# Patient Record
Sex: FEMALE | Race: WHITE
[De-identification: names, ages, dates, MRNs, and addresses within clinical notes are randomized per-mention and may not be internally consistent; named-entity substitution may affect disease eponyms.]

---

## 2018-02-08 ENCOUNTER — HOSPITAL ENCOUNTER (OUTPATIENT)
Dept: HOSPITAL 75 - CARD | Age: 73
End: 2018-02-08
Attending: INTERNAL MEDICINE
Payer: MEDICARE

## 2018-02-08 VITALS — DIASTOLIC BLOOD PRESSURE: 93 MMHG | SYSTOLIC BLOOD PRESSURE: 152 MMHG

## 2018-02-08 VITALS — DIASTOLIC BLOOD PRESSURE: 84 MMHG | SYSTOLIC BLOOD PRESSURE: 179 MMHG

## 2018-02-08 DIAGNOSIS — I10: ICD-10-CM

## 2018-02-08 DIAGNOSIS — E66.9: ICD-10-CM

## 2018-02-08 DIAGNOSIS — E78.5: ICD-10-CM

## 2018-02-08 DIAGNOSIS — I63.9: ICD-10-CM

## 2018-02-08 DIAGNOSIS — Z01.810: Primary | ICD-10-CM

## 2018-02-08 PROCEDURE — 78452 HT MUSCLE IMAGE SPECT MULT: CPT

## 2018-02-08 PROCEDURE — 93017 CV STRESS TEST TRACING ONLY: CPT

## 2018-02-08 PROCEDURE — 93306 TTE W/DOPPLER COMPLETE: CPT

## 2018-02-08 NOTE — STRESS TEST
DATE OF SERVICE:  02/08/2018



LEXISCAN STRESS TEST REPORT



PRIMARY PHYSICIAN:

Dr. Debbie Quinn.



PERFORMING PHYSICIAN:

Dr. SAUMYA Salcedo.



INDICATION:

Hyperlipidemia, hypertension, obesity, stroke, preoperative cardiovascular risk

assessment.



PROCEDURE DETAILS:

The patient was brought to the stress lab after informed consent was taken. 

Lexiscan stress test was performed according to the protocol.  A 0.4 mg of IV

Lexiscan was given.  Low-grade exercise was performed.  Baseline EKG showed

sinus rhythm at 62 BPM.  Blood pressure 152/93 mmHg.  Maximum heart rate was 102

BPM and blood pressure was 179/84 mmHg.  The patient did not have any chest

pain, arrhythmias or EKG changes during the stress test.  A 9.29 mCi of Myoview

were given for rest imaging and 28.8 mCi of Myoview were given for stress

imaging.  TID 0.97.  EF 68% with normal wall motion.  Normal perfusion during

rest and stress imaging.



CONCLUSION:

1.  Pharmacological stress test is negative for ischemia.

2.  Normal LV function with no wall motion abnormalities.

3.  Normal perfusion during stress and rest.





Job ID: [id number]

DocumentID: [id number]

Dictated Date:  02/08/2018 18:59:51

Transcription Date: 02/08/2018 19:48:04

Dictated By: BRIDGET SALCEDO MD

## 2020-08-24 ENCOUNTER — HOSPITAL ENCOUNTER (OUTPATIENT)
Dept: HOSPITAL 75 - CARD | Age: 75
End: 2020-08-24
Attending: INTERNAL MEDICINE
Payer: MEDICARE

## 2020-08-24 DIAGNOSIS — I10: ICD-10-CM

## 2020-08-24 DIAGNOSIS — I63.9: ICD-10-CM

## 2020-08-24 DIAGNOSIS — I25.10: Primary | ICD-10-CM

## 2020-08-24 PROCEDURE — 93306 TTE W/DOPPLER COMPLETE: CPT

## 2022-06-01 ENCOUNTER — HOSPITAL ENCOUNTER (OUTPATIENT)
Dept: HOSPITAL 75 - CARD | Age: 77
End: 2022-06-01
Attending: INTERNAL MEDICINE
Payer: MEDICARE

## 2022-06-01 VITALS — SYSTOLIC BLOOD PRESSURE: 191 MMHG | DIASTOLIC BLOOD PRESSURE: 81 MMHG

## 2022-06-01 VITALS — BODY MASS INDEX: 30.84 KG/M2 | HEIGHT: 65.75 IN | WEIGHT: 189.6 LBS

## 2022-06-01 DIAGNOSIS — I11.9: Primary | ICD-10-CM

## 2022-06-01 DIAGNOSIS — I25.10: ICD-10-CM

## 2022-06-01 PROCEDURE — 93017 CV STRESS TEST TRACING ONLY: CPT

## 2022-06-01 PROCEDURE — 78452 HT MUSCLE IMAGE SPECT MULT: CPT

## 2022-06-01 PROCEDURE — 93306 TTE W/DOPPLER COMPLETE: CPT

## 2022-06-01 NOTE — CARDIOLOGY STRESS TEST REPORT
Stress Test Report


Date of Procedure/Referring:


Date of Procedure:  Jun 1, 2022


PCP


Debbie Quinn DO


Admitting Physician


Admitting Physician:


 








Attending Physician:


Madelaine Almanzar MD





Indications:


HTN





Baseline Heart Rate:


56





Baseline Blood Pressure:


Blood Pressure Systolic:  191


Blood Pressure Diastolic:  81





Vital Signs








  Date Time  Temp Pulse Resp B/P (MAP) Pulse Ox O2 Delivery O2 Flow Rate FiO2


 


6/1/22 13:11  61  191/81 (117)    








Baseline Vital Signs





Vital Signs








  Date Time  Temp Pulse Resp B/P (MAP) Pulse Ox O2 Delivery O2 Flow Rate FiO2


 


6/1/22 13:11  61  191/81 (117)    











Baseline EKG:


Baseline EKG:  NSR





Summary:


After explaining the procedure and details to the patient, she  signed the 

consent and was brought to the stress nuclear laboratory.





Patient exercised on standard Saqib protocol, EKG, heart rate and blood pressure

were monitored continuously, resting and stress doses of radio tracer were 

injected, imaging was acquired and reviewed in the short axis, horizontal long 

axis and vertical long axis views





Patient was able to exercise for a total of 3 minutes on Saqib protocol,  METs 

4.6


Maximum heart rate 131      


Maximum blood pressure 219/91       


Stress EKG, Minimal nondiagnostic changes


Recovery EKG, Return to baseline


TID:  1.12


SSS:  4


SDS:  4


EF:  71





Conclusion:


1.  Poor exercise tolerance for a total of 3 minutes on standard Saqib protocol,

4.6 METS achieving 91% of maximal expected heart rate


2.  Appropriate heart rate response to exercise with severe hypertensive 

response to exercise return to baseline during recovery


3.  Nondiagnostic EKG changes with exercise return to baseline during recovery


4.  Breast attenuation with mild reversible ischemia involving the mid to apical

anterolateral and inferolateral wall


5.  Normal left ventricular size, ejection fraction 71%





Copy


Copies To 1:   DEBBIE QUINN BASHAR J MD               Jun 1, 2022 14:39

## 2022-06-24 ENCOUNTER — HOSPITAL ENCOUNTER (OUTPATIENT)
Dept: HOSPITAL 75 - CATH | Age: 77
Discharge: HOME | End: 2022-06-24
Attending: INTERNAL MEDICINE
Payer: MEDICARE

## 2022-06-24 VITALS — SYSTOLIC BLOOD PRESSURE: 146 MMHG | DIASTOLIC BLOOD PRESSURE: 85 MMHG

## 2022-06-24 VITALS — DIASTOLIC BLOOD PRESSURE: 91 MMHG | SYSTOLIC BLOOD PRESSURE: 178 MMHG

## 2022-06-24 VITALS — DIASTOLIC BLOOD PRESSURE: 83 MMHG | SYSTOLIC BLOOD PRESSURE: 165 MMHG

## 2022-06-24 VITALS — SYSTOLIC BLOOD PRESSURE: 136 MMHG | DIASTOLIC BLOOD PRESSURE: 70 MMHG

## 2022-06-24 VITALS — SYSTOLIC BLOOD PRESSURE: 163 MMHG | DIASTOLIC BLOOD PRESSURE: 91 MMHG

## 2022-06-24 VITALS — HEIGHT: 66.5 IN | WEIGHT: 194.01 LBS | BODY MASS INDEX: 30.81 KG/M2

## 2022-06-24 VITALS — SYSTOLIC BLOOD PRESSURE: 124 MMHG | DIASTOLIC BLOOD PRESSURE: 75 MMHG

## 2022-06-24 VITALS — SYSTOLIC BLOOD PRESSURE: 173 MMHG | DIASTOLIC BLOOD PRESSURE: 95 MMHG

## 2022-06-24 VITALS — DIASTOLIC BLOOD PRESSURE: 71 MMHG | SYSTOLIC BLOOD PRESSURE: 157 MMHG

## 2022-06-24 VITALS — DIASTOLIC BLOOD PRESSURE: 82 MMHG | SYSTOLIC BLOOD PRESSURE: 144 MMHG

## 2022-06-24 VITALS — DIASTOLIC BLOOD PRESSURE: 83 MMHG | SYSTOLIC BLOOD PRESSURE: 129 MMHG

## 2022-06-24 VITALS — SYSTOLIC BLOOD PRESSURE: 152 MMHG | DIASTOLIC BLOOD PRESSURE: 82 MMHG

## 2022-06-24 VITALS — DIASTOLIC BLOOD PRESSURE: 64 MMHG | SYSTOLIC BLOOD PRESSURE: 139 MMHG

## 2022-06-24 DIAGNOSIS — R94.39: ICD-10-CM

## 2022-06-24 DIAGNOSIS — E78.5: ICD-10-CM

## 2022-06-24 DIAGNOSIS — I12.9: ICD-10-CM

## 2022-06-24 DIAGNOSIS — I25.10: Primary | ICD-10-CM

## 2022-06-24 DIAGNOSIS — N18.30: ICD-10-CM

## 2022-06-24 DIAGNOSIS — I65.23: ICD-10-CM

## 2022-06-24 LAB
ALBUMIN SERPL-MCNC: 4.4 GM/DL (ref 3.2–4.5)
ALP SERPL-CCNC: 81 U/L (ref 40–136)
ALT SERPL-CCNC: 17 U/L (ref 0–55)
APTT BLD: 26 SEC (ref 24–35)
APTT PPP: YELLOW S
BACTERIA #/AREA URNS HPF: (no result) /HPF
BILIRUB SERPL-MCNC: 0.6 MG/DL (ref 0.1–1)
BILIRUB UR QL STRIP: NEGATIVE
BUN/CREAT SERPL: 13
CALCIUM SERPL-MCNC: 9.3 MG/DL (ref 8.5–10.1)
CHLORIDE SERPL-SCNC: 102 MMOL/L (ref 98–107)
CO2 SERPL-SCNC: 28 MMOL/L (ref 21–32)
CREAT SERPL-MCNC: 1.43 MG/DL (ref 0.6–1.3)
FIBRINOGEN PPP-MCNC: CLEAR MG/DL
GFR SERPLBLD BASED ON 1.73 SQ M-ARVRAT: 38 ML/MIN
GLUCOSE SERPL-MCNC: 111 MG/DL (ref 70–105)
GLUCOSE UR STRIP-MCNC: NEGATIVE MG/DL
HCT VFR BLD CALC: 43 % (ref 35–52)
HGB BLD-MCNC: 14 G/DL (ref 11.5–16)
INR PPP: 0.9 (ref 0.8–1.4)
KETONES UR QL STRIP: NEGATIVE
LEUKOCYTE ESTERASE UR QL STRIP: (no result)
MCH RBC QN AUTO: 30 PG (ref 25–34)
MCHC RBC AUTO-ENTMCNC: 32 G/DL (ref 32–36)
MCV RBC AUTO: 91 FL (ref 80–99)
NITRITE UR QL STRIP: POSITIVE
PH UR STRIP: 6.5 [PH] (ref 5–9)
PLATELET # BLD: 268 10^3/UL (ref 130–400)
PMV BLD AUTO: 9.6 FL (ref 9–12.2)
POTASSIUM SERPL-SCNC: 3.8 MMOL/L (ref 3.6–5)
PROT SERPL-MCNC: 7.6 GM/DL (ref 6.4–8.2)
PROT UR QL STRIP: NEGATIVE
PROTHROMBIN TIME: 13 SEC (ref 12.2–14.7)
RBC #/AREA URNS HPF: (no result) /HPF
SODIUM SERPL-SCNC: 142 MMOL/L (ref 135–145)
SP GR UR STRIP: <=1.005 (ref 1.02–1.02)
SQUAMOUS #/AREA URNS HPF: (no result) /HPF
WBC # BLD AUTO: 7.2 10^3/UL (ref 4.3–11)
WBC #/AREA URNS HPF: (no result) /HPF

## 2022-06-24 PROCEDURE — 93005 ELECTROCARDIOGRAM TRACING: CPT

## 2022-06-24 PROCEDURE — 87186 SC STD MICRODIL/AGAR DIL: CPT

## 2022-06-24 PROCEDURE — 36415 COLL VENOUS BLD VENIPUNCTURE: CPT

## 2022-06-24 PROCEDURE — 80053 COMPREHEN METABOLIC PANEL: CPT

## 2022-06-24 PROCEDURE — 93458 L HRT ARTERY/VENTRICLE ANGIO: CPT

## 2022-06-24 PROCEDURE — 85730 THROMBOPLASTIN TIME PARTIAL: CPT

## 2022-06-24 PROCEDURE — 81000 URINALYSIS NONAUTO W/SCOPE: CPT

## 2022-06-24 PROCEDURE — 85610 PROTHROMBIN TIME: CPT

## 2022-06-24 PROCEDURE — 87081 CULTURE SCREEN ONLY: CPT

## 2022-06-24 PROCEDURE — 85027 COMPLETE CBC AUTOMATED: CPT

## 2022-06-24 PROCEDURE — 87088 URINE BACTERIA CULTURE: CPT

## 2022-06-24 PROCEDURE — 71045 X-RAY EXAM CHEST 1 VIEW: CPT

## 2022-06-24 PROCEDURE — 87077 CULTURE AEROBIC IDENTIFY: CPT

## 2022-06-24 NOTE — CONSCIOUS SEDATION/ASA
Conscious Sedation Pre-Proced


Time


10:27





ASA Score


3


For ASA 3 and 4: Consider anesthesia and medical clearance. Also, for patients

with a history of failed moderate sedation consider anesthesia.

















Airway 


 


Lungs 


 


Heart 


 


 ASA score


 


 ASA 1: a normal healthy patient


 


 ASA 2:  a patient with a mild systemic disease (mid diabetes, controlled 

hypertension, obesity 


 


x ASA 3:  a patient with a severe systemic disease that limits activity  

(angina, COPD, prior Myocardial infarction)


 


 ASA 4:  a patient with an incapacitating disease that is a constant threat to 

life (CHF, renal failure)


 


 ASA 5:  a moribund patient not expected to survive 24 hrs.  (ruptured aneurysm)


 


 ASA 6:  a declared brain-dead patient whose organs are being harvested.


 


 For emergent operations, add the letter E after the classification











Mallampati Classification


Grade 3





Sedation Plan


Analgesia, Amnesia, Plan communicated to team members, Discussed options with 

patient/fam, Discussed risks with patient/fam


The patient is an appropriate candidate to undergo the planned procedure, 

sedation, and anesthesia.





The patient immediately re-assessed prior to indication.











SERENA OWENS MD              Jun 24, 2022 10:27

## 2022-06-24 NOTE — CARDIAC CATH REPORT
Cardiac Cath Report


Physician (s)/Assistant (s)


Physician


SERENA OWENS MD





Pre-Procedure Diagnosis


Pre-Procedure Diagnosis:  Coronary artery disease





Post-Procedure Note


Procedure Start Date:  Jun 24, 2022


Name of Procedure:  


Left heart catheterization


Stenting to the LAD


Findings/Procedure Note


PROCEDURE NOTE:


76-year-old lady with history of coronary artery disease, had an abnormal stress

test with anterior wall ischemia, has been having chest pain, scheduled for 

cardiac catheterization possible PTCA.


After explaining the procedure to the patient, all pros and cons were explained,

all questions were answered.  The patient signed the consent and then she  was 

placed on the cardiac catheterization laboratory. Groin was prepped SL fashion 

local anesthesia was used. Sheath placed in the right radial artery, I was 

unable to advance the regular J-wire, I used a glide J-wire and advanced Tiger 

catheter over it to the left ventricular cavity, pressure was measured pullback 

LV to aorta was done, engage the left coronary system, I was unable to engage 

the right system, exchanged the catheter and used Mehul right catheter, engage

the right system.  Then decided to proceed with percutaneous intervention.





A total of 6000 units of heparin were given, EBU guide was advanced, BMW wire 

was advanced through the LAD and parked distally, patient has 70 to 80% mid LAD 

stenosis, primary stenting with katie point 3 x 15 mm stent deployed under 16 shawn 

up to 3.2 mm.  Angiogram showed excellent results.


At the end of the procedure the sheath was removed.  Vascular band deployed





FINDINGS:





Hemodynamics 


/64 mean of 92


Aorta 143/63 mean of 70





ANATOMY:


Left Main very short with notes obstructive disease


Left Anterior Descending  is calcified artery with patent stent proximally, 

severe stenosis at the mid LAD successful stenting using katie point 3 x 15 mm 

expanded to 3.2 mm with excellent results


Left Circumflex has mild disease nonobstructive disease, dominant artery


Right Coronary Artery is very small artery nondominant artery


LV Gram was not done, pressure was measured





CONCLUSION:


1.  Calcified LAD with 70 to 80% mid LAD stenosis successful primary stenting 

using katie point stent 3 x 15 mm expanded to 3.2 mm with excellent results.


2.  Dominant circumflex artery with mild disease no significant obstructive 

disease, small nondominant right coronary system


3.  Normal left ventricular end-diastolic pressure.





DISCUSSION AND RECOMMENDATION:


Patient has been on aspirin and Plavix, continue current medication monitor


Anesthesia Type:  Conscious Sedation


Estimated blood loss (mL):  30 ml


Contrast Amount:  121 ml


Total Radiation Dose:  1259 mGy





Post-Procedure Diagnosis


Post-operative diagnosis:  


Chest pain


Coronary artery disease


Hypertension


Hyperlipidemia











SERENA OWENS MD              Jun 24, 2022 12:12

## 2022-06-24 NOTE — DIAGNOSTIC IMAGING REPORT
CLINICAL INDICATION: Pre-heart catheterization. No chest

complaints.



EXAM: Portable chest x-ray upright view.



COMPARISON: Chest x-ray dated 12/11/2012.



FINDINGS:



Lungs/pleura: Lungs are clear. There is no pneumothorax. There is

no pleural effusion.



Mediastinum: Again seen tortuous descending thoracic aorta is

noted.. 



Pulmonary vasculature: Unremarkable.



Heart: There is mild cardiomegaly.



Bones/extrathoracic soft tissue: There are hypertrophic spurs

involving the thoracic spine.



IMPRESSION:

There is mild cardiomegaly with no significant pulmonary vascular

congestion. There is no radiographic evidence of acute

cardiopulmonary process.



Dictated by: 



  Dictated on workstation # [id number]

## 2022-06-24 NOTE — DISCHARGE INST-POST CATH
Discharge Inst-CATH/EP


Problems Reviewed?:  Yes


Post Cardiac Cath/EP D/C Inst


Follow Up/Plan


Appointment with Dr. Almanzar's office in 2 to 4 weeks


<b>CARDIAC CATH/EP PROCEDURE DISCHARGE INSTRUCTIONS</b>





ACTIVITY





* Go Home directly and rest.


* Limit activity of the leg (or wrist if it was used) for 7 days including aer

obics, swimming,


   jogging, bicycling, etc.


* Restrict stair-climbing for 7 days if possible, if not, climb up with your 

non-cath leg, then


   bring together on the same step.


* Avoid lifting, pushing, pulling or excessive movement of the affected extremi

ty for 7 days.


* Customary sexual activity may be resumed after 2 days-use caution not to use a

position  


   that strains or causes pain to the affected extremity.


* No driving for 24 hours.


* NO SMOKING. 


* Avoid straining for bowel movements for 7 days.


* Gentle walking on level ground is allowed.


* Returning to work will depend on the type of procedure and the results. Your 

doctor will discuss


   this with you.





CALL YOUR DOCTOR FOR ANY OF THE FOLLOWING:





*If bleeding from the puncture site occurs- Apply gentle pressure to site with 

clean cloth and call


   your doctor or EMS.


* If a knot or lump forms under the skin, increases in size, or causes pain.


* If bruising appears to be worsening or moving further down your leg instead of

disappearing.


* Temperature above 101 F.





CARE OF YOUR GROIN INCISION;





* Bruising or purple discoloration of the skin near the puncture site is common.


* You may shower only, no bathtub bathing for 5 days.  Be careful to avoid 

slipping as your


   leg may feel stiff.


* If a closure device was used on your femoral artery, please see the attached 

guide regarding


   care of the device and your leg.


* Leave dressing on FOR 24 hours.





CARE OF YOUR WRIST INCISION;





* Bruising or purple discoloration of the skin near the puncture site is common.


* You may shower.


* DO NOT submerge wrist.


* Leave dressing on FOR 24 hours.











SERENA ALMANZAR MD              Jun 24, 2022 18:23

## 2023-01-11 ENCOUNTER — HOSPITAL ENCOUNTER (OUTPATIENT)
Dept: HOSPITAL 75 - CARD | Age: 78
End: 2023-01-11
Attending: PHYSICIAN ASSISTANT
Payer: MEDICARE

## 2023-01-11 VITALS — DIASTOLIC BLOOD PRESSURE: 83 MMHG | SYSTOLIC BLOOD PRESSURE: 167 MMHG

## 2023-01-11 DIAGNOSIS — I25.10: Primary | ICD-10-CM

## 2023-01-11 DIAGNOSIS — I10: ICD-10-CM

## 2023-01-11 PROCEDURE — 78452 HT MUSCLE IMAGE SPECT MULT: CPT

## 2023-01-11 PROCEDURE — 93017 CV STRESS TEST TRACING ONLY: CPT

## 2023-01-11 RX ADMIN — Medication PRN ML: at 07:57

## 2023-01-11 RX ADMIN — Medication PRN ML: at 08:11

## 2023-01-11 NOTE — CARDIOLOGY STRESS TEST REPORT
Stress Test Report


Date of Procedure/Referring:


Date of Procedure:  Jan 11, 2023


PCP


Debbie Quinn DO


Admitting Physician


Admitting Physician:


 








Attending Physician:


Noemí Kolb





Indications:


CP





Baseline Heart Rate:


53





Baseline Blood Pressure:


Blood Pressure Systolic:  167


Blood Pressure Diastolic:  83


Baseline Vitals





Vital Signs








  Date Time  Temp Pulse Resp B/P (MAP) Pulse Ox O2 Delivery O2 Flow Rate FiO2


 


1/11/23 09:44  71  167/83 (111)    











Baseline EKG:


Baseline EKG:  NSR





Summary


After explaining the procedure to the patient, she  signed a consent and then 

brought to the stress nuclear laboratory.


Patient received 0.4 mg Lexiscan for stress test, ECG, heart rate and blood 

pressure were monitored continuously.  Resting and stress dose of radio tracer 

were injected, imaging was acquired and reviewed in short axis, horizontal long 

axis and vertical long axis views.


TID:  1.11


SSS:  7


SDS:  5


EF:  73


1.  Patient tolerated Lexiscan well


2.  Reversible ischemia involving the anterior wall and anterolateral wall


3.  Normal left ventricular size, ejection fraction 73%





Copy


Copies To 1:   DEBBIE QUINN BASHAR J MD              Jan 11, 2023 11:57

## 2023-01-25 ENCOUNTER — HOSPITAL ENCOUNTER (OUTPATIENT)
Dept: HOSPITAL 75 - CATH | Age: 78
Discharge: HOME | End: 2023-01-25
Attending: INTERNAL MEDICINE
Payer: MEDICARE

## 2023-01-25 VITALS — DIASTOLIC BLOOD PRESSURE: 70 MMHG | SYSTOLIC BLOOD PRESSURE: 136 MMHG

## 2023-01-25 VITALS — DIASTOLIC BLOOD PRESSURE: 67 MMHG | SYSTOLIC BLOOD PRESSURE: 122 MMHG

## 2023-01-25 VITALS — DIASTOLIC BLOOD PRESSURE: 71 MMHG | SYSTOLIC BLOOD PRESSURE: 147 MMHG

## 2023-01-25 VITALS — HEIGHT: 66.5 IN | WEIGHT: 192.46 LBS | BODY MASS INDEX: 30.57 KG/M2

## 2023-01-25 VITALS — DIASTOLIC BLOOD PRESSURE: 83 MMHG | SYSTOLIC BLOOD PRESSURE: 172 MMHG

## 2023-01-25 VITALS — DIASTOLIC BLOOD PRESSURE: 71 MMHG | SYSTOLIC BLOOD PRESSURE: 111 MMHG

## 2023-01-25 VITALS — DIASTOLIC BLOOD PRESSURE: 88 MMHG | SYSTOLIC BLOOD PRESSURE: 165 MMHG

## 2023-01-25 VITALS — SYSTOLIC BLOOD PRESSURE: 187 MMHG | DIASTOLIC BLOOD PRESSURE: 99 MMHG

## 2023-01-25 VITALS — SYSTOLIC BLOOD PRESSURE: 129 MMHG | DIASTOLIC BLOOD PRESSURE: 61 MMHG

## 2023-01-25 DIAGNOSIS — I65.23: ICD-10-CM

## 2023-01-25 DIAGNOSIS — Z79.82: ICD-10-CM

## 2023-01-25 DIAGNOSIS — E78.2: ICD-10-CM

## 2023-01-25 DIAGNOSIS — Z95.5: ICD-10-CM

## 2023-01-25 DIAGNOSIS — N18.30: ICD-10-CM

## 2023-01-25 DIAGNOSIS — I12.9: ICD-10-CM

## 2023-01-25 DIAGNOSIS — Z87.891: ICD-10-CM

## 2023-01-25 DIAGNOSIS — I73.9: ICD-10-CM

## 2023-01-25 DIAGNOSIS — R07.9: Primary | ICD-10-CM

## 2023-01-25 DIAGNOSIS — R42: ICD-10-CM

## 2023-01-25 DIAGNOSIS — Z79.02: ICD-10-CM

## 2023-01-25 DIAGNOSIS — I25.10: ICD-10-CM

## 2023-01-25 DIAGNOSIS — Z86.73: ICD-10-CM

## 2023-01-25 DIAGNOSIS — E66.9: ICD-10-CM

## 2023-01-25 LAB
ALBUMIN SERPL-MCNC: 4.2 GM/DL (ref 3.2–4.5)
ALP SERPL-CCNC: 69 U/L (ref 40–136)
ALT SERPL-CCNC: 12 U/L (ref 0–55)
APTT BLD: 27 SEC (ref 24–35)
APTT PPP: YELLOW S
BACTERIA #/AREA URNS HPF: (no result) /HPF
BILIRUB SERPL-MCNC: 0.7 MG/DL (ref 0.1–1)
BILIRUB UR QL STRIP: NEGATIVE
BUN/CREAT SERPL: 13
CALCIUM SERPL-MCNC: 9.3 MG/DL (ref 8.5–10.1)
CHLORIDE SERPL-SCNC: 105 MMOL/L (ref 98–107)
CHOLEST SERPL-MCNC: 136 MG/DL (ref ?–200)
CO2 SERPL-SCNC: 24 MMOL/L (ref 21–32)
CREAT SERPL-MCNC: 1.41 MG/DL (ref 0.6–1.3)
FIBRINOGEN PPP-MCNC: CLEAR MG/DL
GFR SERPLBLD BASED ON 1.73 SQ M-ARVRAT: 38 ML/MIN
GLUCOSE SERPL-MCNC: 103 MG/DL (ref 70–105)
GLUCOSE UR STRIP-MCNC: NEGATIVE MG/DL
HCT VFR BLD CALC: 42 % (ref 35–52)
HDLC SERPL-MCNC: 53 MG/DL (ref 40–60)
HGB BLD-MCNC: 13.5 G/DL (ref 11.5–16)
INR PPP: 1 (ref 0.8–1.4)
KETONES UR QL STRIP: NEGATIVE
LEUKOCYTE ESTERASE UR QL STRIP: (no result)
MCH RBC QN AUTO: 30 PG (ref 25–34)
MCHC RBC AUTO-ENTMCNC: 33 G/DL (ref 32–36)
MCV RBC AUTO: 91 FL (ref 80–99)
NITRITE UR QL STRIP: POSITIVE
PH UR STRIP: 5.5 [PH] (ref 5–9)
PLATELET # BLD: 225 10^3/UL (ref 130–400)
PMV BLD AUTO: 9.7 FL (ref 9–12.2)
POTASSIUM SERPL-SCNC: 3.5 MMOL/L (ref 3.6–5)
PROT SERPL-MCNC: 7.1 GM/DL (ref 6.4–8.2)
PROT UR QL STRIP: NEGATIVE
PROTHROMBIN TIME: 13.7 SEC (ref 12.2–14.7)
RBC #/AREA URNS HPF: (no result) /HPF
SODIUM SERPL-SCNC: 141 MMOL/L (ref 135–145)
SP GR UR STRIP: 1.02 (ref 1.02–1.02)
SQUAMOUS #/AREA URNS HPF: (no result) /HPF
TRIGL SERPL-MCNC: 103 MG/DL (ref ?–150)
VLDLC SERPL CALC-MCNC: 21 MG/DL (ref 5–40)
WBC # BLD AUTO: 6.9 10^3/UL (ref 4.3–11)

## 2023-01-25 PROCEDURE — 87088 URINE BACTERIA CULTURE: CPT

## 2023-01-25 PROCEDURE — 80053 COMPREHEN METABOLIC PANEL: CPT

## 2023-01-25 PROCEDURE — 93458 L HRT ARTERY/VENTRICLE ANGIO: CPT

## 2023-01-25 PROCEDURE — 85610 PROTHROMBIN TIME: CPT

## 2023-01-25 PROCEDURE — 71045 X-RAY EXAM CHEST 1 VIEW: CPT

## 2023-01-25 PROCEDURE — 81000 URINALYSIS NONAUTO W/SCOPE: CPT

## 2023-01-25 PROCEDURE — 87077 CULTURE AEROBIC IDENTIFY: CPT

## 2023-01-25 PROCEDURE — 85730 THROMBOPLASTIN TIME PARTIAL: CPT

## 2023-01-25 PROCEDURE — 36415 COLL VENOUS BLD VENIPUNCTURE: CPT

## 2023-01-25 PROCEDURE — 85027 COMPLETE CBC AUTOMATED: CPT

## 2023-01-25 PROCEDURE — 93005 ELECTROCARDIOGRAM TRACING: CPT

## 2023-01-25 PROCEDURE — 80061 LIPID PANEL: CPT

## 2023-01-25 PROCEDURE — 87081 CULTURE SCREEN ONLY: CPT

## 2023-01-25 NOTE — CARDIAC PROCEDURE NOTE-CS/ASA
Pre-Procedure Note


Pre-Op Procedure Note


Date of Available H&P:  Jan 12, 2023


Date H&P Reviewed:  Jan 25, 2023


Time H&P Reviewed:  08:23


History & Physical:  H&P Reviewed, Patient Examed, No changes noted


Pre-Operative Diagnosis:  Coronary artery disease





Conscious Sedation Pre-Proced


Time


08:23





ASA Score


3


For ASA 3 and 4: Consider anesthesia and medical clearance. Also, for patients

with a history of failed moderate sedation consider anesthesia.

















Airway 


 


Lungs 


 


Heart 


 


 ASA score


 


 ASA 1: a normal healthy patient


 


 ASA 2:  a patient with a mild systemic disease (mid diabetes, controlled 

hypertension, obesity 


 


 ASA 3:  a patient with a severe systemic disease that limits activity  (angina,

COPD, prior Myocardial infarction)


 


 ASA 4:  a patient with an incapacitating disease that is a constant threat to 

life (CHF, renal failure)


 


 ASA 5:  a moribund patient not expected to survive 24 hrs.  (ruptured aneurysm)


 


 ASA 6:  a declared brain-dead patient whose organs are being harvested.


 


 For emergent operations, add the letter E after the classification











Mallampati Classification


Grade 3





Sedation Plan


Analgesia, Amnesia, Plan communicated to team members, Discussed options with 

patient/fam, Discussed risks with patient/fam


The patient is an appropriate candidate to undergo the planned procedure, 

sedation, and anesthesia.





The patient immediately re-assessed prior to indication.











SERENA OWENS MD              Jan 25, 2023 08:23

## 2023-01-25 NOTE — CARDIAC CATH REPORT
Cardiac Cath Report


Physician (s)/Assistant (s)


Physician


SERENA OWENS MD





Pre-Procedure Diagnosis


Pre-Procedure Diagnosis:  Coronary artery disease





Post-Procedure Note


Procedure Start Date:  Jan 25, 2023


Name of Procedure:  


Left heart catheterization


Findings/Procedure Note


PROCEDURE NOTE:


77-year-old lady with history of coronary artery disease, had an abnormal stress

test with anterior wall ischemia scheduled for cardiac catheterization possible 

PTCA.


After explaining the procedure to the patient, all pros and cons were explained,

all questions were answered.  The patient signed the consent and then she  was 

placed in the cardiac catheterization laboratory. Groin was prepped in SL 

fashion local anesthesia was used. Sheath placed in the right radial artery, I 

had difficulty advancing the J-wire, used baby J-wire, Tiger catheter advanced 

and engaged the left coronary system, angiogram was done.  Then exchanged over a

long J-wire to a Mehul right catheter, advanced to the left ventricular 

cavity, pressure was measured, engage the right coronary artery and angiogram 

was done.


At the end of the procedure the sheath was removed.  Vascular band was used





FINDINGS:





Hemodynamics 


/24, end-diastolic pressure of 24


Aorta 169/85 mean of 120





ANATOMY:


Left Main is free of obstructive disease


Left Anterior Descending is heavily calcified artery proximally with no 

obstructive disease, patent stent in the mid LAD, at the mid to distal segment 

there was 2 area of 50% stenosis nonobstructive disease


Left Circumflex is dominant artery with no significant obstructive disease, 60% 

stenosis at the distal circumflex artery small branch.  Nonobstructive disease


Right Coronary Artery is small nondominant artery with 40% stenosis 

nonobstructive disease


LV Gram was not done, pressure was measured





CONCLUSION:


1.  Heavily calcified LAD with patent stent in the mid LAD, distally there were 

2 areas of 50% stenosis nonobstructive disease


2.  Otherwise mild to moderate disease in the distal circumflex artery and mid 

right coronary artery nonobstructive disease


3.  Elevated left ventricular end-diastolic pressure, hypertensive heart disease





DISCUSSION AND RECOMMENDATION:


Continue to maximize medical therapy


Anesthesia Type:  Conscious Sedation


Estimated blood loss (mL):  20 ml


Contrast Amount:  43 ml


Total Radiation Dose:  407 mGy





Post-Procedure Diagnosis


Post-operative diagnosis:  


Chest pain


Coronary artery disease


Hypertension


Hyperlipidemia











SERENA OWENS MD              Jan 25, 2023 09:34

## 2023-01-25 NOTE — DIAGNOSTIC IMAGING REPORT
INDICATION: Coronary disease, preop for coronary angiography.



Frontal chest obtained at 07:47 a.m. compared with 06/24/2022



FINDINGS: The heart is mildly enlarged. Aorta is tortuous. The

lungs are clear. There is no pneumothorax or pleural fluid.



IMPRESSION: Cardiomegaly with no acute process in the chest.



Dictated by: 



  Dictated on workstation # [id number]

## 2023-01-25 NOTE — DISCHARGE INST-POST CATH
Discharge Inst-CATH/EP


Problems Reviewed?:  Yes


Post Cardiac Cath/EP D/C Inst


Follow Up/Plan


Appointment with Dr. Almanzar's office in 2 to 4 weeks


<b>CARDIAC CATH/EP PROCEDURE DISCHARGE INSTRUCTIONS</b>





ACTIVITY





* Go Home directly and rest.


* Limit activity of the leg (or wrist if it was used) for 7 days including aer

obics, swimming,


   jogging, bicycling, etc.


* Restrict stair-climbing for 7 days if possible, if not, climb up with your 

non-cath leg, then


   bring together on the same step.


* Avoid lifting, pushing, pulling or excessive movement of the affected extremi

ty for 7 days.


* Customary sexual activity may be resumed after 2 days-use caution not to use a

position  


   that strains or causes pain to the affected extremity.


* No driving for 24 hours.


* NO SMOKING. 


* Avoid straining for bowel movements for 7 days.


* Gentle walking on level ground is allowed.


* Returning to work will depend on the type of procedure and the results. Your 

doctor will discuss


   this with you.





CALL YOUR DOCTOR FOR ANY OF THE FOLLOWING:





*If bleeding from the puncture site occurs- Apply gentle pressure to site with 

clean cloth and call


   your doctor or EMS.


* If a knot or lump forms under the skin, increases in size, or causes pain.


* If bruising appears to be worsening or moving further down your leg instead of

disappearing.


* Temperature above 101 F.





CARE OF YOUR GROIN INCISION;





* Bruising or purple discoloration of the skin near the puncture site is common.


* You may shower only, no bathtub bathing for 5 days.  Be careful to avoid 

slipping as your


   leg may feel stiff.


* If a closure device was used on your femoral artery, please see the attached 

guide regarding


   care of the device and your leg.


* Leave dressing on FOR 24 hours.





CARE OF YOUR WRIST INCISION;





* Bruising or purple discoloration of the skin near the puncture site is common.


* You may shower.


* DO NOT submerge wrist.


* Leave dressing on FOR 24 hours.











SERENA ALMANZAR MD              Jan 25, 2023 09:12